# Patient Record
Sex: FEMALE | Race: ASIAN
[De-identification: names, ages, dates, MRNs, and addresses within clinical notes are randomized per-mention and may not be internally consistent; named-entity substitution may affect disease eponyms.]

---

## 2018-09-27 ENCOUNTER — HOSPITAL ENCOUNTER (OUTPATIENT)
Dept: HOSPITAL 80 - FIMAGING | Age: 23
End: 2018-09-27
Attending: FAMILY MEDICINE
Payer: MEDICAID

## 2018-09-27 DIAGNOSIS — R10.2: Primary | ICD-10-CM

## 2018-09-27 DIAGNOSIS — Z97.5: ICD-10-CM

## 2019-02-15 ENCOUNTER — HOSPITAL ENCOUNTER (EMERGENCY)
Dept: HOSPITAL 80 - FED | Age: 24
Discharge: HOME | End: 2019-02-15
Payer: MEDICAID

## 2019-02-15 VITALS — SYSTOLIC BLOOD PRESSURE: 123 MMHG | DIASTOLIC BLOOD PRESSURE: 83 MMHG

## 2019-02-15 DIAGNOSIS — N39.0: Primary | ICD-10-CM

## 2019-02-15 NOTE — EDPHY
H & P


Stated Complaint: Burns with urination


Time Seen by Provider: 02/15/19 04:50


HPI/ROS: 





HPI





CHIEF COMPLAINT:  Dysuria.





HISTORY OF PRESENT ILLNESS:  Patient 23-year-old female, presents emergency 

room stating she thinks she may have a urinary tract infection.  She complains 

of dysuria.  She denies any abdominal pain, denies pelvic pain, denies fever, 

denies vomiting, denies back pain.


Symptoms got worse tonight so she decided come the emergency room for 

evaluation.








Past Medical History:  Denies medical history





Past Surgical History:  Denies surgical history





Social History:  [employer] student.  Denies daily use of drugs 

alcohol tobacco.





Family History:  Noncontributory








ROS   


REVIEW OF SYSTEMS:


10 Systems were reviewed and negative with the exception of the elements 

mentioned in the history of present illness.








Exam   


Constitutional   triage nursing summary reviewed, vital signs reviewed, awake/

alert. 


Eyes   normal conjunctivae and sclera, EOMI, PERRLA. 


HENT   normal inspection, atraumatic, moist mucus membranes, no epistaxis, neck 

supple/ no meningismus, no raccoon eyes. 


Respiratory   clear to auscultation bilaterally, normal breath sounds, no 

respiratory distress, no wheezing. 


Cardiovascular   rate normal, regular rhythm, no murmur, no edema, distal 

pulses normal. 


Gastrointestinal   soft, non-tender, no rebound, no guarding, normal bowel 

sounds, no distension, no pulsatile mass. 


Genitourinary   no CVA tenderness. 


Musculoskeletal  no midline vertebral tenderness, full range of motion, no calf 

swelling, no tenderness of extremities, no meningismus, good pulses, 

neurovascularly intact.


Skin   pink, warm, & dry, no rash, skin atraumatic. 


Neurologic   awake, alert and oriented x 3, AAOx3, moves all 4 extremities 

equally, motor intact, sensory intact, CN II-XII intact, normal cerebellar, 

normal vision, normal speech. 


Psychiatric   normal mood/affect. 


Heme/Lymph/Immune   no lymphadenopathy.





Differential Diagnosis:  UTI, cystitis, pyelonephritis.  Pregnancy.





Medical Decision Making:  Check UA.  And urine pregnancy.





Re-evaluation:








Urinalysis reviewed.  Shows urinary tract infection.  Urine culture sent.





Keflex and pyridium provided





Take-home packs for Keflex





Return precautions discussed with the patient understands return emergency room 

if there is worsening abdominal pain, fever, vomiting, not doing well.





She is comfortable discharge planning.


Source: Patient





- Personal History


LMP (Females 10-55): IUD In Place


Current Tetanus/Diphtheria Vaccine: Yes


Current Tetanus Diphtheria and Acellular Pertussis (TDAP): Yes


Tetanus Vaccine Date: 2018





- Medical/Surgical History


Hx Asthma: No


Hx Chronic Respiratory Disease: No


Hx Diabetes: No


Hx Cardiac Disease: No


Hx Renal Disease: No


Hx Cirrhosis: No


Hx Alcoholism: No


Hx HIV/AIDS: No


Hx Splenectomy or Spleen Trauma: No


Other PMH: medical  none.  surgery  cholecystectomy, gallstones;.  kidney 

infection; Uti;





- Social History


Smoking Status: Never smoked


Constitutional: 


 Initial Vital Signs











Temperature (C)  36.6 C   02/15/19 04:31


 


Heart Rate  104 H  02/15/19 04:31


 


Respiratory Rate  16   02/15/19 04:31


 


Blood Pressure  122/85 H  02/15/19 04:31


 


O2 Sat (%)  99   02/15/19 04:31








 











O2 Delivery Mode               Room Air














Allergies/Adverse Reactions: 


 





amoxicillin [Amoxicillin] Allergy (Unknown, Verified 02/15/19 04:34)


 


Penicillins Allergy (Unknown, Verified 02/15/19 04:34)


 








Home Medications: 














 Medication  Instructions  Recorded


 


Cephalexin [Keflex] 500 mg PO Q6H #28 cap 02/15/19


 


Phenazopyridine HCl [Pyridium] 200 mg PO TID #15 tab 02/15/19














Medical Decision Making





- Data Points


Laboratory Results: 


 











  02/15/19 02/15/19





  04:42 04:42


 


Urine Color    RED 





   


 


Urine Appearance    TURBID 





   


 


Urine pH    6.0 





    (5.0-7.5) 


 


Ur Specific Gravity    1.021 





    (1.002-1.030) 


 


Urine Protein    2+  H 





    (NEGATIVE) 


 


Urine Ketones    1+  H 





    (NEGATIVE) 


 


Urine Blood    3+  H 





    (NEGATIVE) 


 


Urine Nitrate    POSITIVE  H 





    (NEGATIVE) 


 


Urine Bilirubin    NEGATIVE 





    (NEGATIVE) 


 


Urine Urobilinogen    NEGATIVE EU EU





    (0.2-1.0) 


 


Ur Leukocyte Esterase    2+  H 





    (NEGATIVE) 


 


Urine RBC     /hpf H /hpf





    (0-3) 


 


Urine WBC     /hpf H /hpf





    (0-3) 


 


Ur Epithelial Cells    TRACE /lpf /lpf





    (NONE-1+) 


 


Urine Bacteria    1+ /hpf H /hpf





    (NONE SEEN) 


 


Urine Mucus    2+ /lpf H /lpf





    (NONE-1+) 


 


Urine Glucose    NEGATIVE 





    (NEGATIVE) 


 


Urine Pregnancy Test  NEGATIVE   





   











Medications Given: 


 








Discontinued Medications





Cephalexin (Keflex 500 Mg Prepack#4)  1 btl TAKEHOME EDNOW ONE


   PRN Reason: Protocol


   Stop: 02/15/19 04:57


   Last Admin: 02/15/19 05:03 Dose:  1 btl


Cephalexin HCl (Keflex)  500 mg PO EDNOW ONE


   PRN Reason: Protocol


   Stop: 02/15/19 04:57


   Last Admin: 02/15/19 05:03 Dose:  500 mg


Phenazopyridine HCl (Pyridium)  200 mg PO EDNOW ONE


   Stop: 02/15/19 04:57


   Last Admin: 02/15/19 05:03 Dose:  200 mg








Departure





- Departure


Disposition: Home, Routine, Self-Care


Clinical Impression: 


 Urinary tract infection





Condition: Good


Instructions:  Cephalexin (By mouth), Urinary Tract Infection in Women (ED)


Additional Instructions: 


1. Drink lots of fluids stay well-hydrated


2. Antibiotics as prescribed


3. Return emergency room if worsening symptoms includes worsening abdominal pain

, back pain, fever, vomiting.


Referrals: 


NONE *PRIMARY CARE P,. [Primary Care Provider] - As per Instructions


[facility] [Clinic] - As per Instructions


Prescriptions: 


Cephalexin [Keflex] 500 mg PO Q6H #28 cap


Phenazopyridine HCl [Pyridium] 200 mg PO TID #15 tab